# Patient Record
Sex: FEMALE | URBAN - METROPOLITAN AREA
[De-identification: names, ages, dates, MRNs, and addresses within clinical notes are randomized per-mention and may not be internally consistent; named-entity substitution may affect disease eponyms.]

---

## 2023-06-16 ENCOUNTER — TELEPHONE ENCOUNTER (OUTPATIENT)
Dept: URBAN - METROPOLITAN AREA CLINIC 113 | Facility: CLINIC | Age: 38
End: 2023-06-16

## 2023-08-16 ENCOUNTER — DASHBOARD ENCOUNTERS (OUTPATIENT)
Age: 38
End: 2023-08-16

## 2023-08-16 PROBLEM — 15633921000119109: Status: ACTIVE | Noted: 2023-08-16

## 2023-08-17 ENCOUNTER — OFFICE VISIT (OUTPATIENT)
Dept: URBAN - METROPOLITAN AREA CLINIC 113 | Facility: CLINIC | Age: 38
End: 2023-08-17

## 2023-08-17 NOTE — HPI-OTHER HISTORIES
Ultrasound examination on 4/24/2023 revealed increased heterogeneity of the liver with no focal liver lesions.  There is normal bile ducts and the gallbladder was normal.

## 2023-08-17 NOTE — HPI-TODAY'S VISIT:
38-year-old referred for a second opinion for abdominal pain and abnormal ultrasound by Anthony Romeo NP.  A copy this report will be sent to his office.  Blood work on 4/6/2023 revealed a WBC of 6, hemoglobin 11.7 and platelet count 299,000.  Sodium 143 potassium 4.1 BUN 11 creatinine 0.  8 7.  AST 18, ALT 8, alk phosphatase 66, total bili 0.9.  H. pylori IgG antibody negative.

## 2023-10-26 ENCOUNTER — OFFICE VISIT (OUTPATIENT)
Dept: URBAN - METROPOLITAN AREA CLINIC 113 | Facility: CLINIC | Age: 38
End: 2023-10-26

## 2023-10-26 NOTE — HPI-OTHER HISTORIES
Abdominal US 4/24/23: Abnormal liver echotexture characterized as coarse and heterogeneous. No liver mass identified. Labs 4/19/23: WBC 6, Hg 1.7, MCV 89, Plt 299, H. pylori IgG Abs negative, ALT 8, AST 18, Tbili 0.9, ALP 66, Na 143, K 4.1, BUN 11, Crt 0.87. Calculated Fib 4 score is 0.85, consistent with low risk for fibrosis.

## 2023-10-26 NOTE — HPI-TODAY'S VISIT:
37 yo woman referred by Anthony Romeo NP from [facility] for evaluation of abdominal pain. A copy of today's visit will be forwarded to the referring provider.